# Patient Record
Sex: FEMALE | Race: ASIAN
[De-identification: names, ages, dates, MRNs, and addresses within clinical notes are randomized per-mention and may not be internally consistent; named-entity substitution may affect disease eponyms.]

---

## 2019-07-03 ENCOUNTER — APPOINTMENT (OUTPATIENT)
Dept: ORTHOPEDIC SURGERY | Facility: CLINIC | Age: 39
End: 2019-07-03
Payer: COMMERCIAL

## 2019-07-03 VITALS — WEIGHT: 125 LBS | BODY MASS INDEX: 21.34 KG/M2 | RESPIRATION RATE: 16 BRPM | HEIGHT: 64 IN

## 2019-07-03 DIAGNOSIS — Z87.891 PERSONAL HISTORY OF NICOTINE DEPENDENCE: ICD-10-CM

## 2019-07-03 DIAGNOSIS — Z78.9 OTHER SPECIFIED HEALTH STATUS: ICD-10-CM

## 2019-07-03 DIAGNOSIS — M65.9 SYNOVITIS AND TENOSYNOVITIS, UNSPECIFIED: ICD-10-CM

## 2019-07-03 PROBLEM — Z00.00 ENCOUNTER FOR PREVENTIVE HEALTH EXAMINATION: Status: ACTIVE | Noted: 2019-07-03

## 2019-07-03 PROCEDURE — 99203 OFFICE O/P NEW LOW 30 MIN: CPT | Mod: 25

## 2019-07-03 PROCEDURE — 73110 X-RAY EXAM OF WRIST: CPT | Mod: 50

## 2019-07-03 PROCEDURE — 20600 DRAIN/INJ JOINT/BURSA W/O US: CPT | Mod: RT

## 2019-08-05 ENCOUNTER — MOBILE ON CALL (OUTPATIENT)
Age: 39
End: 2019-08-05

## 2019-08-05 ENCOUNTER — OTHER (OUTPATIENT)
Age: 39
End: 2019-08-05

## 2023-08-24 ENCOUNTER — NON-APPOINTMENT (OUTPATIENT)
Age: 43
End: 2023-08-24

## 2023-08-31 ENCOUNTER — APPOINTMENT (OUTPATIENT)
Dept: ORTHOPEDIC SURGERY | Facility: CLINIC | Age: 43
End: 2023-08-31
Payer: COMMERCIAL

## 2023-08-31 VITALS — WEIGHT: 115 LBS | BODY MASS INDEX: 19.63 KG/M2 | RESPIRATION RATE: 16 BRPM | HEIGHT: 64 IN

## 2023-08-31 PROCEDURE — 73110 X-RAY EXAM OF WRIST: CPT | Mod: 50

## 2023-08-31 PROCEDURE — 99203 OFFICE O/P NEW LOW 30 MIN: CPT

## 2023-08-31 NOTE — PHYSICAL EXAM
[de-identified] : There were no skin changes from previous injection.  There is mild swelling and tenderness localized over the right TFCC with pain upon forced pronation and supination which is not present on the opposite wrist. There is no tenderness of the ECU bilaterally. There are no masses palpable at the  level the wrists bilaterally.There is symmetric range of motion of the wrists bilaterally without tenderness over the scaphoid scapholunate or lunotriquetral ligaments and no evidence of instability of the wrists and radioulnar joint bilaterally. No tenderness or instability of the CMC joints, pisotriquetral joint, or hamate hook.   There is good capillary refill of the digits bilaterally.There is no masses or sensitivity over the median and ulnar nerves at the level of the wrist. There is a negative Tinel's and negative Phalen's sign bilaterally. The sensation is grossly intact bilaterally.  Driss's test shows excellent flow through the radial and ulnar artery was intact arch bilaterally.  There is excellent capillary of the digits bilaterally with sensation grossly intact bilaterally. [de-identified] : PA lateral and oblique show some degenerative changes and osteophytes involving the radial ulnar joint of the right wrist.  There is ulnar negative variance bilaterally with some changes around the ulnar styloid region but no evidence of cysts around the lunotriquetral region.  Scapholunate ligament stress views in neutral radial and ulnar deviation shows no evidence of carpal instability as compared to the opposite side.

## 2023-08-31 NOTE — ASSESSMENT
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  Options were discussed ranging from further work-up with MRI, therapy, splinting, injection, or surgical intervention.  Risk associate with each option discussed and all questions answered.  Since these problem has been occurring for several years and not resolved despite rest activity modifications and previous splinting she would like to proceed with an MRI.  Based on the results of the MRI a definitive plan will be developed.

## 2023-08-31 NOTE — HISTORY OF PRESENT ILLNESS
[Right] : right hand dominant [FreeTextEntry1] : Patient here for recurrent right TFCC pain especially with forced extension and twisting motions for at least 6 months despite 1 injection and splinting back in 2019.  Patient states his symptoms resolved for several years but recurred without history of trauma.  Patient states the pain was tolerable for several years but has definitely picked up over the last couple months.

## 2023-09-18 ENCOUNTER — APPOINTMENT (OUTPATIENT)
Dept: ORTHOPEDIC SURGERY | Facility: CLINIC | Age: 43
End: 2023-09-18
Payer: COMMERCIAL

## 2023-09-18 PROCEDURE — 99442: CPT

## 2023-09-29 ENCOUNTER — TRANSCRIPTION ENCOUNTER (OUTPATIENT)
Age: 43
End: 2023-09-29

## 2023-10-02 ENCOUNTER — TRANSCRIPTION ENCOUNTER (OUTPATIENT)
Age: 43
End: 2023-10-02

## 2023-10-18 ENCOUNTER — APPOINTMENT (OUTPATIENT)
Dept: ORTHOPEDIC SURGERY | Facility: CLINIC | Age: 43
End: 2023-10-18
Payer: COMMERCIAL

## 2023-10-18 DIAGNOSIS — M24.131 OTHER ARTICULAR CARTILAGE DISORDERS, RIGHT WRIST: ICD-10-CM

## 2023-10-18 PROCEDURE — 99442: CPT
